# Patient Record
Sex: MALE | Race: WHITE | Employment: OTHER | URBAN - METROPOLITAN AREA
[De-identification: names, ages, dates, MRNs, and addresses within clinical notes are randomized per-mention and may not be internally consistent; named-entity substitution may affect disease eponyms.]

---

## 2022-09-28 ENCOUNTER — HOSPITAL ENCOUNTER (EMERGENCY)
Dept: GENERAL RADIOLOGY | Age: 62
Discharge: HOME | End: 2022-10-01
Payer: COMMERCIAL

## 2022-09-28 ENCOUNTER — HOSPITAL ENCOUNTER (EMERGENCY)
Age: 62
Discharge: HOME | End: 2022-09-28
Attending: STUDENT IN AN ORGANIZED HEALTH CARE EDUCATION/TRAINING PROGRAM
Payer: COMMERCIAL

## 2022-09-28 DIAGNOSIS — S61.412A LACERATION OF LEFT HAND INVOLVING TENDON, INITIAL ENCOUNTER: Primary | ICD-10-CM

## 2022-09-28 DIAGNOSIS — S66.922A LACERATION OF LEFT HAND INVOLVING TENDON, INITIAL ENCOUNTER: Primary | ICD-10-CM

## 2022-09-28 PROCEDURE — 73130 X-RAY EXAM OF HAND: CPT

## 2022-09-28 PROCEDURE — 12032 INTMD RPR S/A/T/EXT 2.6-7.5: CPT

## 2022-09-28 PROCEDURE — 99283 EMERGENCY DEPT VISIT LOW MDM: CPT

## 2022-09-28 PROCEDURE — 6370000000 HC RX 637 (ALT 250 FOR IP): Performed by: NURSE PRACTITIONER

## 2022-09-28 RX ORDER — HYDROCODONE BITARTRATE AND ACETAMINOPHEN 5; 325 MG/1; MG/1
1 TABLET ORAL EVERY 8 HOURS PRN
Qty: 10 TABLET | Refills: 0 | Status: SHIPPED | OUTPATIENT
Start: 2022-09-28 | End: 2022-10-01

## 2022-09-28 RX ORDER — TETANUS AND DIPHTHERIA TOXOIDS ADSORBED 2; 2 [LF]/.5ML; [LF]/.5ML
0.5 INJECTION INTRAMUSCULAR
Status: DISCONTINUED | OUTPATIENT
Start: 2022-09-28 | End: 2022-09-28

## 2022-09-28 RX ORDER — CEPHALEXIN 500 MG/1
500 CAPSULE ORAL
Status: COMPLETED | OUTPATIENT
Start: 2022-09-28 | End: 2022-09-28

## 2022-09-28 RX ORDER — GINSENG 100 MG
CAPSULE ORAL ONCE
Status: COMPLETED | OUTPATIENT
Start: 2022-09-28 | End: 2022-09-28

## 2022-09-28 RX ORDER — CEPHALEXIN 500 MG/1
500 CAPSULE ORAL 4 TIMES DAILY
Qty: 28 CAPSULE | Refills: 0 | Status: SHIPPED | OUTPATIENT
Start: 2022-09-28 | End: 2022-10-05

## 2022-09-28 RX ADMIN — CEPHALEXIN 500 MG: 500 CAPSULE ORAL at 15:11

## 2022-09-28 RX ADMIN — BACITRACIN: 500 OINTMENT TOPICAL at 15:15

## 2022-09-28 ASSESSMENT — PAIN SCALES - GENERAL: PAINLEVEL_OUTOF10: 2

## 2022-09-28 ASSESSMENT — PAIN DESCRIPTION - LOCATION: LOCATION: HAND

## 2022-09-28 ASSESSMENT — PAIN DESCRIPTION - ORIENTATION: ORIENTATION: LEFT

## 2022-09-28 ASSESSMENT — PAIN - FUNCTIONAL ASSESSMENT: PAIN_FUNCTIONAL_ASSESSMENT: 0-10

## 2022-09-28 NOTE — ED PROVIDER NOTES
[facility] Emergency Department Provider Note                   PCP:                None Provider               Age: 65 y.o. Sex: male       ICD-10-CM    1. Laceration of left hand involving tendon, initial encounter  S61.412A HYDROcodone-acetaminophen (NORCO) 5-325 MG per tablet    E52.363D           DISPOSITION      DC    MDM     HPI as charted. Pt neck is supple, no meningeal signs. Lungs are clear to auscultation, no diminished sounds. Abdomen is soft and not tender. Patient that I suspect there is an extensor tendon injury, though he does have intact strength and range of motion. No fracture on x-ray, other findings as below. I informed patient of all findings and plan. I informed that I planned on consulting Ortho/hand on-call to advise on therapeutic measures and follow-up. Patient states he does not want me to do this and request that repair be performed in the ED today. States he does not plan to follow-up with Ortho/hand. I discussed/recommended repairing what appeared to be frayed and check tendon which she declines. Requesting laceration repair, states \"it works and I do not want to mess with it. \"  Pn well controlled, is neurovascularly intact, discussed with attending. Patient repair performed as in procedure note. Given antibiotics in the ED and will discharge home with prescription for antibiotics, short course of analgesia. I have made the patient were dated has been watchful of, infection prevention identification I have urged the patient to call Ortho/hand upon discharge or tomorrow if he changes his mind, to facilitate earliest possible follow-up with specialist and that failing to obtain recommended medical evaluation management could have serious adverse consequence which could include loss of health, loss of function, death, other adverse effect. Tdap administered. Patient tolerates well. Pain is well controlled.   Directed on appropriate therapeutic measures, to keep dry for the next 24 hours, after which time he may wash with clean running water, but not submerge or exposed to natural water sources. Will discharge with antibiotics, instructed on infection prevention and identification. Return to the ED immediately for any new, worsening, concerning symptoms. Antibiotic ointment and dressings applied. All questions were answered. Patient stable for discharge home. Patient is afebrile, hemodynamically stable and in no acute distress. Patient is not ill-appearing. Discussed patient with attending who reviewed the patient's results and collaborated on care planning. All findings and plans were discussed with the patient. Patient verbalizes desire to be discharged home at this time. All questions answered. Discussed with the patient that an unremarkable evaluation in the ED does not preclude the development or presence of a serious or life threatening condition. Patient was instructed to return immediately for any worsening or change in current symptoms, or if symptoms do not continue to improve. I instructed them to follow up with their primary care provider, own specialist, or medical provider that I am recommending for him within the next 2-3 days     the patient acknowledged understanding plan of care and affirmed approval. Patient is discharged home, with no further complaint. Orders Placed This Encounter   Procedures    XR HAND LEFT (MIN 3 VIEWS)        Víctor Jones is a 65 y.o. male who presents to the Emergency Department with chief complaint of    Chief Complaint   Patient presents with    Hand Laceration      HPI  46-year-old male presents to the ED with complaint of laceration to the dorsum of his left hand. Follow-up, he excellently cut with band saw. Has approximately 3 cm laceration to the back of the hand. Denies any significant pain. He has full active range of motion of the hand and fingers without pain or limitation.   Last tetanus booster. No known therapeutic measures. Nothing noted make worse or better. Denies other injury. No numbness or tingling. Denies fever/chills, change in appetite, weight loss, decreased oral intake, blurred vision, headaches, neck pain/stiffness. Alert & oriented x 3, afebrile, hemodynamically stable, non-toxic appearing, appears in no distress. Medical/surgical/social history reviewed with the patient. Review of Systems  Constitutional: Negative for fever. Negative for appetite change, chills, diaphoresis and unexpected weight change. HENT: As in HPI     Eyes: Negative. Respiratory: As in HPI   Cardiovascular: Negative. GI/: As in HPI      Musculoskeletal: As in HPI   Skin: HPI. Allergic/Immunologic: Negative. Neurological: Negative. No past medical history on file. No past surgical history on file. No family history on file. Social History     Socioeconomic History    Marital status: Legally          Shellfish-derived products and Dilaudid [hydromorphone]     Previous Medications    No medications on file        Vitals signs and nursing note reviewed. Patient Vitals for the past 4 hrs:   Temp Pulse BP   09/28/22 1228 99 °F (37.2 °C) 63 138/82          Physical Exam   Constitutional: Oriented to person, place, and time. Appears well-developed and well-nourished. No distress. HENT:    Head: Normocephalic and atraumatic. Right Ear: External ear normal.    Left Ear: External ear normal.    Nose: Nose normal.    Mouth/Throat: Mouth normal. Uvula midline, no erythema/exudates/edema. No drooling, no voice change. No pain with ROM of neck. Eyes: Conjunctivae are normal.   Neck: Supple. No tracheal deviation. Not tender, not rigid, no menningeal signs. Cardiovascular: Normal rate, intact distal pulses. Pulmonary/Chest: No respiratory distress. Abdominal: Soft. Musculoskeletal: No obvious deformity, erythema, edema.   Full active range of motion of the affected hand, digits and wrist without any diminished strength noting. Intact distal pulses, cap refill less than 2 seconds, skin of normal color and temperature. No visualized bony process. Able to visualize extensor tendon. Neurological: Alert and oriented to person, place, and time. Sensation intact without deficit. Skin: 3 cm raised at the dorsum of the right hand. Able to visualize tendon. No other abrasion, no lesion, no petechiae and no rash noted. Not diaphoretic. No cyanosis, erythema, or pallor. Psychiatric: Normal mood and affect. Behavior is normal.    Nursing note and vitals reviewed.      Lac Repair    Date/Time: 9/28/2022 9:04 PM  Performed by: LJ Blunt - [facility]  Authorized by: Iris López DO     Consent:     Consent obtained:  Verbal    Consent given by:  Patient    Risks, benefits, and alternatives were discussed: yes      Risks discussed:  Pain, retained foreign body, infection, poor cosmetic result, need for additional repair, vascular damage, poor wound healing, nerve damage and tendon damage    Alternatives discussed:  Referral  [place] protocol:     Procedure explained and questions answered to patient or proxy's satisfaction: yes      Relevant documents present and verified: yes      Imaging studies available: yes      Patient identity confirmed:  Verbally with patient and arm band  Anesthesia:     Anesthesia method:  Local infiltration    Local anesthetic:  Lidocaine 1% w/o epi  Laceration details:     Location:  Hand    Hand location:  L hand, dorsum    Length (cm):  3  Pre-procedure details:     Preparation:  Patient was prepped and draped in usual sterile fashion and imaging obtained to evaluate for foreign bodies  Exploration:     Hemostasis achieved with:  Direct pressure    Imaging obtained: x-ray      Imaging outcome: foreign body not noted      Wound exploration: wound explored through full range of motion and entire depth of wound visualized Wound extent: no underlying fracture noted      Contaminated: yes    Treatment:     Area cleansed with:  Povidone-iodine    Amount of cleaning:  Extensive    Irrigation solution:  Sterile water    Irrigation volume:  10 minutes running tap water. Irrigation method:  Tap    Visualized foreign bodies/material removed: yes    Skin repair:     Repair method:  Sutures    Suture size:  4-0    Suture material:  Prolene    Suture technique:  Simple interrupted    Number of sutures:  6  Approximation:     Approximation:  Close  Repair type:     Repair type:  Simple  Post-procedure details:     Dressing:  Antibiotic ointment and non-adherent dressing    Procedure completion:  Tolerated well, no immediate complications    8 sutures    Labs Reviewed - No data to display     XR HAND LEFT (MIN 3 VIEWS)   Final Result      1. Dorsal laceration with a few punctate foreign bodies. No acute osseous   abnormality. 2. Chronic healed fifth metacarpal neck fracture. Voice dictation software was used during the making of this note. This software is not perfect and grammatical and other typographical errors may be present. This note has not been completely proofread for errors.          Maggy Cutler, LJ - CNP  09/28/22 [id number]

## 2022-09-28 NOTE — LETTER
[facility] EMERGENCY DEPT  [address] [address]  Phone: [phone]  Fax: [phone]             September 28, 2022    Patient: Meggan Infante   YOB: 1960   Date of Visit: 9/28/2022       To Whom It May Concern:    Meggan Infante was seen and treated in our emergency department on 9/28/2022. He may return to work on 09/30/2022.       Sincerely,             Signature:__________________________________

## 2022-09-28 NOTE — ED TRIAGE NOTES
Patient advises he was at work today at [email or web address] and cut top of left hand with gerda-band saw about 1-1 1/2 inch. Patient with non-adhesive dressing applied and coban in place. Masked. Able to to move all fingers at this time.

## 2022-10-03 VITALS
HEIGHT: 69 IN | WEIGHT: 195 LBS | OXYGEN SATURATION: 98 % | SYSTOLIC BLOOD PRESSURE: 163 MMHG | BODY MASS INDEX: 28.88 KG/M2 | HEART RATE: 76 BPM | DIASTOLIC BLOOD PRESSURE: 90 MMHG | TEMPERATURE: 99 F | RESPIRATION RATE: 16 BRPM